# Patient Record
Sex: FEMALE | Employment: FULL TIME | URBAN - METROPOLITAN AREA
[De-identification: names, ages, dates, MRNs, and addresses within clinical notes are randomized per-mention and may not be internally consistent; named-entity substitution may affect disease eponyms.]

---

## 2023-12-14 ENCOUNTER — OFFICE VISIT (OUTPATIENT)
Age: 53
End: 2023-12-14

## 2023-12-14 DIAGNOSIS — Z01.419 ENCOUNTER FOR WELL WOMAN EXAM: Primary | ICD-10-CM

## 2023-12-14 NOTE — PROGRESS NOTES
Assessment/Plan:    Kathryn Armas was seen today for annual exam.    Diagnoses and all orders for this visit:    Encounter for well woman exam        No follow-up provider specified. SANDEE Sarkar expressed understanding of this plan. An AVS was printed and given to the patient.      ----------------------------------------------------------------------    Chief Complaint   Patient presents with    Annual Exam     [facility]         History of Present Illness:  [facility] annual well woman exam, this is her first visit with us and her first mammogram   one  and one c/s  She has no breast concerns or complaints  She is UTD on pap through [facility]. She is having severe hot flashes- she asks if there is any tx. I have advised that she speak to her PCP about trying a med for this  She denies any risk for DV       No past medical history on file. No current outpatient medications on file. No current facility-administered medications for this visit. No Known Allergies    Social History     Tobacco Use    Smoking status: Never    Smokeless tobacco: Never       No family history on file.     Physical Exam:         A&Ox3  WDWN NAD  Respirations normal and non labored  Breast exam- fabricio neg for mass, tenderness, skin color changes, dimpling or retractions

## 2023-12-14 NOTE — PROGRESS NOTES
[facility] HISTORY QUESTIONNAIRE       No Yes Comments   Has a doctor ever seen or felt anything wrong with your breast? [x]                                  []                                     Have you ever had a breast biopsy? [x]                                  []                                          When and where was [facility]    Have you ever been told that there was a problem on your mammogram?   No Yes Comments   []                                  []                                  N/a     Do you have breast implants? No Yes Comments   [x]                                  []                                       When was your last Pap test performed? [address] / declined [facility] pap services today. Have you ever had an abnormal Pap test?   No Yes Comments   [x]                                  []                                       Have you had a hysterectomy? No Yes Comments (why)   [x]                                  []                                       Have you been through menopause? No Yes Date of LMP   []                                  [x]                                  2021     Did your mother take STEVEN? No Yes Unknown   [x]                                  []                                       Do you have a history of HIV exposure? No Yes    [x]                                  []                                       Have you ever been diagnosed with any type of Cancer   No Yes Comments (type,when,where,type of treatment   [x]                                  []                                          Has a family member been diagnosed with breast or ovarian cancer?    No Yes Comments (which family members, and type   [x]                                  []                                       Are you taking hormone replacement therapy (HRT)     No Yes Comments   [x]